# Patient Record
Sex: MALE | Race: WHITE | NOT HISPANIC OR LATINO | Employment: FULL TIME | URBAN - METROPOLITAN AREA
[De-identification: names, ages, dates, MRNs, and addresses within clinical notes are randomized per-mention and may not be internally consistent; named-entity substitution may affect disease eponyms.]

---

## 2017-02-09 ENCOUNTER — OFFICE VISIT (OUTPATIENT)
Dept: URGENT CARE | Facility: URGENT CARE | Age: 37
End: 2017-02-09
Payer: COMMERCIAL

## 2017-02-09 VITALS
OXYGEN SATURATION: 95 % | TEMPERATURE: 98.3 F | HEART RATE: 80 BPM | SYSTOLIC BLOOD PRESSURE: 110 MMHG | WEIGHT: 179 LBS | DIASTOLIC BLOOD PRESSURE: 80 MMHG

## 2017-02-09 DIAGNOSIS — Z20.89 EXPOSURE TO PNEUMONIA: ICD-10-CM

## 2017-02-09 DIAGNOSIS — R06.2 WHEEZING: Primary | ICD-10-CM

## 2017-02-09 PROCEDURE — 99214 OFFICE O/P EST MOD 30 MIN: CPT | Mod: 25 | Performed by: PHYSICIAN ASSISTANT

## 2017-02-09 PROCEDURE — 94640 AIRWAY INHALATION TREATMENT: CPT | Performed by: PHYSICIAN ASSISTANT

## 2017-02-09 RX ORDER — ALBUTEROL SULFATE 90 UG/1
2 AEROSOL, METERED RESPIRATORY (INHALATION) EVERY 6 HOURS
Qty: 1 INHALER | Refills: 0 | Status: SHIPPED | OUTPATIENT
Start: 2017-02-09

## 2017-02-09 RX ORDER — ALBUTEROL SULFATE 0.83 MG/ML
1 SOLUTION RESPIRATORY (INHALATION) ONCE
Qty: 3 ML | Refills: 0
Start: 2017-02-09 | End: 2017-02-09

## 2017-02-09 RX ORDER — AZITHROMYCIN 250 MG/1
TABLET, FILM COATED ORAL
Qty: 6 TABLET | Refills: 0 | Status: SHIPPED | OUTPATIENT
Start: 2017-02-09

## 2017-02-09 RX ORDER — PREDNISONE 20 MG/1
20 TABLET ORAL 2 TIMES DAILY
Qty: 10 TABLET | Refills: 0 | Status: SHIPPED | OUTPATIENT
Start: 2017-02-09

## 2017-02-09 NOTE — NURSING NOTE
Chief Complaint   Patient presents with     URI     cough for 2 wks,now with wheezing and tight chest       Initial /80 mmHg  Pulse 80  Temp(Src) 98.3  F (36.8  C) (Oral)  Wt 179 lb (81.194 kg)  SpO2 95% There is no height on file to calculate BMI.  Medication Reconciliation: complete   Yessica HOPKINS  Regular cuff

## 2017-02-09 NOTE — MR AVS SNAPSHOT
"              After Visit Summary   2017    Arnav Go    MRN: [id number]           Patient Information     Date Of Birth          1980        Visit Information        Provider Department      2017 4:00 PM Chinmay Walker PA-C [facility]        Today's Diagnoses     Wheezing    -  1    Exposure to pneumonia           Follow-ups after your visit        Who to contact     If you have questions or need follow up information about today's clinic visit or your schedule please contact [facility] directly at [phone].  Normal or non-critical lab and imaging results will be communicated to you by [employer], letter or phone within 4 business days after the clinic has received the results. If you do not hear from us within 7 days, please contact the clinic through [employer] or phone. If you have a critical or abnormal lab result, we will notify you by phone as soon as possible.  Submit refill requests through [employer] or call your pharmacy and they will forward the refill request to us. Please allow 3 business days for your refill to be completed.          Additional Information About Your Visit        MyChart Information     [employer] lets you send messages to your doctor, view your test results, renew your prescriptions, schedule appointments and more. To sign up, go to [email or web address] . Click on \"Log in\" on the left side of the screen, which will take you to the Welcome page. Then click on \"Sign up Now\" on the right side of the page.     You will be asked to enter the access code listed below, as well as some personal information. Please follow the directions to create your username and password.     Your access code is: [email or web address]  Expires: 2017  9:29 AM     Your access code will  in 90 days. If you need help or a new code, please call your [place] clinic or [phone].        Care EveryWhere ID     This is your Care EveryWhere " ID. This could be used by other organizations to access your [place] medical records  [id number]        Your Vitals Were     Pulse Temperature Pulse Oximetry             80 98.3  F (36.8  C) (Oral) 95%          Blood Pressure from Last 3 Encounters:   02/09/17 110/80   02/02/15 120/70    Weight from Last 3 Encounters:   02/09/17 179 lb (81.2 kg)   02/02/15 186 lb 4.8 oz (84.5 kg)              We Performed the Following     INHALATION/NEBULIZER TREATMENT, INITIAL          Today's Medication Changes          These changes are accurate as of: 2/9/17 11:59 PM.  If you have any questions, ask your nurse or doctor.               Start taking these medicines.        Dose/Directions    azithromycin 250 MG tablet   Commonly known as:  ZITHROMAX   Used for:  Exposure to pneumonia   Started by:  Chinmay Walker PA-C        2 tabs po qd day 1, then 1 tab po qd days 2-5   Quantity:  6 tablet   Refills:  0       predniSONE 20 MG tablet   Commonly known as:  DELTASONE   Used for:  Wheezing   Started by:  Chinmay Walker PA-C        Dose:  20 mg   Take 1 tablet (20 mg) by mouth 2 times daily   Quantity:  10 tablet   Refills:  0         These medicines have changed or have updated prescriptions.        Dose/Directions    * albuterol 108 (90 BASE) MCG/ACT Inhaler   Commonly known as:  albuterol   This may have changed:  Another medication with the same name was added. Make sure you understand how and when to take each.   Used for:  Acute bronchospasm   Changed by:  Shola Russell DO        Dose:  2 puff   Inhale 2 puffs into the lungs 4 times daily as needed for shortness of breath / dyspnea or wheezing   Quantity:  1 Inhaler   Refills:  0       * albuterol (2.5 MG/3ML) 0.083% neb solution   This may have changed:  You were already taking a medication with the same name, and this prescription was added. Make sure you understand how and when to take each.   Used for:  Wheezing   Changed by:  Chinmay Walker PA-C        Dose:   1 vial   Take 1 vial (2.5 mg) by nebulization once for 1 dose   Quantity:  3 mL   Refills:  0       * albuterol 108 (90 BASE) MCG/ACT Inhaler   Commonly known as:  PROVENTIL HFA   This may have changed:  You were already taking a medication with the same name, and this prescription was added. Make sure you understand how and when to take each.   Used for:  Wheezing   Changed by:  Chinmay Walker PA-C        Dose:  2 puff   Inhale 2 puffs into the lungs every 6 hours   Quantity:  1 Inhaler   Refills:  0       * Notice:  This list has 3 medication(s) that are the same as other medications prescribed for you. Read the directions carefully, and ask your doctor or other care provider to review them with you.         Where to get your medicines      These medications were sent to [facility] [id number] [facility] [address] [facility]  [address] [facility] [address]    Hours:  24-hours Phone:  [phone]     albuterol 108 (90 BASE) MCG/ACT Inhaler    azithromycin 250 MG tablet    predniSONE 20 MG tablet         Some of these will need a paper prescription and others can be bought over the counter.  Ask your nurse if you have questions.     You don't need a prescription for these medications     albuterol (2.5 MG/3ML) 0.083% neb solution                Primary Care Provider    None Specified       No primary provider on file.        Thank you!     Thank you for choosing [facility]  for your care. Our goal is always to provide you with excellent care. Hearing back from our patients is one way we can continue to improve our services. Please take a few minutes to complete the written survey that you may receive in the mail after your visit with us. Thank you!             Your Updated Medication List - Protect others around you: Learn how to safely use, store and throw away your medicines at www.disposemymeds.org.          This list is accurate as  of: 2/9/17 11:59 PM.  Always use your most recent med list.                   Brand Name Dispense Instructions for use    * albuterol 108 (90 BASE) MCG/ACT Inhaler    albuterol    1 Inhaler    Inhale 2 puffs into the lungs 4 times daily as needed for shortness of breath / dyspnea or wheezing       * albuterol (2.5 MG/3ML) 0.083% neb solution     3 mL    Take 1 vial (2.5 mg) by nebulization once for 1 dose       * albuterol 108 (90 BASE) MCG/ACT Inhaler    PROVENTIL HFA    1 Inhaler    Inhale 2 puffs into the lungs every 6 hours       azithromycin 250 MG tablet    ZITHROMAX    6 tablet    2 tabs po qd day 1, then 1 tab po qd days 2-5       Multi-vitamin Tabs tablet      Take 1 tablet by mouth daily       predniSONE 20 MG tablet    DELTASONE    10 tablet    Take 1 tablet (20 mg) by mouth 2 times daily       * Notice:  This list has 3 medication(s) that are the same as other medications prescribed for you. Read the directions carefully, and ask your doctor or other care provider to review them with you.

## 2017-02-12 NOTE — PROGRESS NOTES
SUBJECTIVE:   Arnav Go is a 36 year old male presenting with a chief complaint of coughing, chest tightness, wheezing.  Onset of symptoms was 2 week(s) ago.  Course of illness is worsening.    Severity moderate  Current and Associated symptoms: chest congestion, coughing, chest tightness  Treatment measures tried include OTC meds.  Predisposing factors include recent illness.    No past medical history on file.     ALLERGIES   No Known Allergies      Social History   Substance Use Topics     Smoking status: Never Smoker     Smokeless tobacco: Current User     Types: Chew     Alcohol use Not on file       ROS:  CONSTITUTIONAL:NEGATIVE for fever, chills, change in weight  INTEGUMENTARY/SKIN: NEGATIVE for worrisome rashes, moles or lesions  ENT/MOUTH: NEGATIVE for ear, mouth and throat problems  RESP:POSITIVE for cough-productive and wheezing  CV: NEGATIVE for chest pain, palpitations or peripheral edema  GI: NEGATIVE for nausea, abdominal pain, heartburn, or change in bowel habits  MUSCULOSKELETAL: NEGATIVE for significant arthralgias or myalgia  NEURO: NEGATIVE for weakness, dizziness or paresthesias    OBJECTIVE  :/80  Pulse 80  Temp 98.3  F (36.8  C) (Oral)  Wt 179 lb (81.2 kg)  SpO2 95%  GENERAL APPEARANCE: healthy, alert and no distress  EYES: EOMI,  PERRL, conjunctiva clear  HENT: ear canals and TM's normal.  Nose and mouth without ulcers, erythema or lesions  NECK: supple, nontender, no lymphadenopathy  RESP: expiratory wheezes generalized  CV: regular rates and rhythm, normal S1 S2, no murmur noted  ABDOMEN:  soft, nontender, no HSM or masses and bowel sounds normal  NEURO: Normal strength and tone, sensory exam grossly normal,  normal speech and mentation  SKIN: no suspicious lesions or rashes    Albuterol nebulizer treatment was given in the clinic today, improved symptoms        ASSESSMENT/PLAN:      ICD-10-CM    1. Wheezing R06.2 albuterol (2.5 MG/3ML) 0.083% neb solution     INHALATION/NEBULIZER  TREATMENT, INITIAL     predniSONE (DELTASONE) 20 MG tablet     albuterol (PROVENTIL HFA) 108 (90 BASE) MCG/ACT Inhaler   2. Exposure to pneumonia Z20.828 azithromycin (ZITHROMAX) 250 MG tablet     Follow up with PCP as needed

## 2022-10-10 ENCOUNTER — HOSPITAL ENCOUNTER (EMERGENCY)
Facility: CLINIC | Age: 42
Discharge: HOME | End: 2022-10-10
Attending: EMERGENCY MEDICINE | Admitting: EMERGENCY MEDICINE
Payer: COMMERCIAL

## 2022-10-10 VITALS
WEIGHT: 177 LBS | HEART RATE: 86 BPM | TEMPERATURE: 98.4 F | RESPIRATION RATE: 20 BRPM | OXYGEN SATURATION: 99 % | DIASTOLIC BLOOD PRESSURE: 110 MMHG | SYSTOLIC BLOOD PRESSURE: 154 MMHG

## 2022-10-10 DIAGNOSIS — M54.16 LUMBAR RADICULOPATHY: ICD-10-CM

## 2022-10-10 PROCEDURE — 96375 TX/PRO/DX INJ NEW DRUG ADDON: CPT | Performed by: EMERGENCY MEDICINE

## 2022-10-10 PROCEDURE — 96374 THER/PROPH/DIAG INJ IV PUSH: CPT | Performed by: EMERGENCY MEDICINE

## 2022-10-10 PROCEDURE — 96376 TX/PRO/DX INJ SAME DRUG ADON: CPT | Performed by: EMERGENCY MEDICINE

## 2022-10-10 PROCEDURE — 250N000011 HC RX IP 250 OP 636: Performed by: EMERGENCY MEDICINE

## 2022-10-10 PROCEDURE — 99285 EMERGENCY DEPT VISIT HI MDM: CPT | Performed by: EMERGENCY MEDICINE

## 2022-10-10 PROCEDURE — 250N000013 HC RX MED GY IP 250 OP 250 PS 637: Performed by: EMERGENCY MEDICINE

## 2022-10-10 PROCEDURE — 99285 EMERGENCY DEPT VISIT HI MDM: CPT | Mod: 25 | Performed by: EMERGENCY MEDICINE

## 2022-10-10 RX ORDER — DEXAMETHASONE SODIUM PHOSPHATE 10 MG/ML
10 INJECTION, SOLUTION INTRAMUSCULAR; INTRAVENOUS ONCE
Status: COMPLETED | OUTPATIENT
Start: 2022-10-10 | End: 2022-10-10

## 2022-10-10 RX ORDER — ACETAMINOPHEN 500 MG
1000 TABLET ORAL ONCE
Status: COMPLETED | OUTPATIENT
Start: 2022-10-10 | End: 2022-10-10

## 2022-10-10 RX ORDER — KETOROLAC TROMETHAMINE 15 MG/ML
15 INJECTION, SOLUTION INTRAMUSCULAR; INTRAVENOUS ONCE
Status: COMPLETED | OUTPATIENT
Start: 2022-10-10 | End: 2022-10-10

## 2022-10-10 RX ORDER — OXYCODONE HYDROCHLORIDE 5 MG/1
5 TABLET ORAL EVERY 6 HOURS PRN
Qty: 15 TABLET | Refills: 0 | Status: SHIPPED | OUTPATIENT
Start: 2022-10-10 | End: 2022-10-25

## 2022-10-10 RX ADMIN — ACETAMINOPHEN 1000 MG: 500 TABLET ORAL at 21:19

## 2022-10-10 RX ADMIN — HYDROMORPHONE HYDROCHLORIDE 1 MG: 1 INJECTION, SOLUTION INTRAMUSCULAR; INTRAVENOUS; SUBCUTANEOUS at 21:16

## 2022-10-10 RX ADMIN — DEXAMETHASONE SODIUM PHOSPHATE 10 MG: 10 INJECTION, SOLUTION INTRAMUSCULAR; INTRAVENOUS at 21:17

## 2022-10-10 RX ADMIN — HYDROMORPHONE HYDROCHLORIDE 1 MG: 1 INJECTION, SOLUTION INTRAMUSCULAR; INTRAVENOUS; SUBCUTANEOUS at 22:46

## 2022-10-10 RX ADMIN — KETOROLAC TROMETHAMINE 15 MG: 15 INJECTION, SOLUTION INTRAMUSCULAR; INTRAVENOUS at 21:16

## 2022-10-10 ASSESSMENT — ENCOUNTER SYMPTOMS
CHILLS: 0
COLOR CHANGE: 0
WOUND: 0
BACK PAIN: 1
FEVER: 0
ARTHRALGIAS: 0
NUMBNESS: 0
WEAKNESS: 0

## 2022-10-10 ASSESSMENT — ACTIVITIES OF DAILY LIVING (ADL)
ADLS_ACUITY_SCORE: 33
ADLS_ACUITY_SCORE: 35

## 2022-10-11 NOTE — DISCHARGE INSTRUCTIONS
I am happy that you have had some improvement of your back pain in the emergency department.  There is good data that if you stay on top of your pain you will have better control.  Please continue to utilize ibuprofen and acetaminophen on a regular basis.  You may use oxycodone as needed for breakthrough pain.  I did place an outpatient follow-up with our spine center for consideration of PT and pain management.  Should you develop red flag symptoms such as loss of bowel or bladder control, weakness in your extremities, uncontrolled pain or other concern we are certainly happy to see you emergently.

## 2022-10-11 NOTE — ED PROVIDER NOTES
History     Chief Complaint   Patient presents with     Back Pain     HPI  Arnav Go is a 42 year old male who has a history significant for low back pain arriving today to the emergency department for abrupt onset of worsening back pain.  The patient reports he was getting out of his car when he noted abrupt onset of lumbar back pain with radiation down the posterior and lateral aspect of his right leg.  The patient reports spasming type pain currently rated at severe in intensity.  He has attempted ibuprofen with minimal relief.  He reports no pain to this degree.  He reports no trauma.  No history of fever or chills.  No IV drug use history.  Patient reports no prior surgery or imaging.  Pain exacerbated by movement and certain positions somewhat alleviated by lying in the supine position.  No loss of bowel or bladder control.  No loss of strength or sensation.    Allergies:  No Known Allergies    Problem List:    There are no problems to display for this patient.       Past Medical History:    No past medical history on file.    Past Surgical History:    No past surgical history on file.    Family History:    No family history on file.    Social History:  Marital Status:   [2]  Social History     Tobacco Use     Smoking status: Never     Smokeless tobacco: Current     Types: Chew        Medications:    oxyCODONE (ROXICODONE) 5 MG tablet  albuterol (PROVENTIL HFA) 108 (90 BASE) MCG/ACT Inhaler  azithromycin (ZITHROMAX) 250 MG tablet  multivitamin, therapeutic with minerals (MULTI-VITAMIN) TABS  predniSONE (DELTASONE) 20 MG tablet          Review of Systems   Constitutional: Negative for chills and fever.   Musculoskeletal: Positive for back pain. Negative for arthralgias.   Skin: Negative for color change, rash and wound.   Neurological: Negative for weakness and numbness.   All other systems reviewed and are negative.      Physical Exam   BP: (!) 154/110  Pulse: 86  Temp: 98.4  F (36.9  C)  Resp:  20  Weight: 80.3 kg (177 lb)  SpO2: 99 %      Physical Exam  Vitals and nursing note reviewed.   Constitutional:       General: He is in acute distress.      Appearance: He is not ill-appearing, toxic-appearing or diaphoretic.   HENT:      Head: Normocephalic and atraumatic.   Cardiovascular:      Rate and Rhythm: Normal rate.      Pulses: Normal pulses.   Skin:     General: Skin is warm and dry.      Capillary Refill: Capillary refill takes less than 2 seconds.      Coloration: Skin is not pale.      Findings: No rash.   Neurological:      General: No focal deficit present.      Mental Status: He is alert and oriented to person, place, and time.      Cranial Nerves: No cranial nerve deficit.      Sensory: No sensory deficit.      Motor: No weakness.      Coordination: Coordination normal.   Psychiatric:         Mood and Affect: Mood normal.         Behavior: Behavior normal.         ED Course                 Procedures           No results found for this or any previous visit (from the past 24 hour(s)).    Medications   dexamethasone PF (DECADRON) injection 10 mg (10 mg Intravenous Given 10/10/22 2117)   HYDROmorphone (DILAUDID) injection 1 mg (1 mg Intravenous Given 10/10/22 2116)   ketorolac (TORADOL) injection 15 mg (15 mg Intravenous Given 10/10/22 2116)   acetaminophen (TYLENOL) tablet 1,000 mg (1,000 mg Oral Given 10/10/22 2119)   HYDROmorphone (DILAUDID) injection 1 mg (1 mg Intravenous Given 10/10/22 2246)       Assessments & Plan (with Medical Decision Making)     I have reviewed the nursing notes.    I have reviewed the findings, diagnosis, plan and need for follow up with the patient.    Arnav Go is a 42 year old male who has a history significant for low back pain arriving today to the emergency department for abrupt onset of worsening back pain.  On arrival this patient is noted to be in significant pain.  He is currently afebrile and hemodynamically stable with mild hypertension.  No evidence of  endorgan dysfunction or vascular compromise.  His symptoms are most consistent with a lumbar disc herniation with radiculopathy.  On my examination he has no red flag symptoms or signs.  My clinical suspicion for severe cord compression warranting neurosurgical intervention or acute MRI would be low.  I do not see evidence of conus medullaris or cauda equina.  He would benefit from IV access with initiation of analgesia and reevaluation of his neurovascular status.    On reevaluation patient is at improvement of symptoms.  Mobility has improved.  I discussed with him red flag symptoms and indication for emergent return.  Otherwise I suspect he would benefit from follow-up in clinic for reevaluation to ensure positive trajectory.  He would likely benefit from physical therapy evaluation and possible pain management for local injection.      Discharge Medication List as of 10/10/2022 11:42 PM      START taking these medications    Details   oxyCODONE (ROXICODONE) 5 MG tablet Take 1 tablet (5 mg) by mouth every 6 hours as needed for pain, Disp-15 tablet, R-0, Local Print             Final diagnoses:   Lumbar radiculopathy       10/10/2022   [facility] EMERGENCY DEPT     Liv, Roman Grewal MD  10/11/22 0213

## 2022-10-11 NOTE — ED TRIAGE NOTES
pt here with low back pain shooting down to right foot.  Getting worse over last couple months.  Sneezed at work today and has been in severe pain since.         Triage Assessment     Row Name 10/10/22 1915       Triage Assessment (Adult)    Airway WDL WDL       Respiratory WDL    Respiratory WDL WDL

## 2025-08-16 ENCOUNTER — HEALTH MAINTENANCE LETTER (OUTPATIENT)
Age: 45
End: 2025-08-16